# Patient Record
Sex: FEMALE | Race: WHITE | NOT HISPANIC OR LATINO | Employment: OTHER | URBAN - METROPOLITAN AREA
[De-identification: names, ages, dates, MRNs, and addresses within clinical notes are randomized per-mention and may not be internally consistent; named-entity substitution may affect disease eponyms.]

---

## 2017-12-05 ENCOUNTER — HISTORICAL (OUTPATIENT)
Dept: INTERNAL MEDICINE | Facility: CLINIC | Age: 62
End: 2017-12-05

## 2018-11-19 ENCOUNTER — HISTORICAL (OUTPATIENT)
Dept: ADMINISTRATIVE | Facility: HOSPITAL | Age: 63
End: 2018-11-19

## 2018-11-19 ENCOUNTER — HISTORICAL (OUTPATIENT)
Dept: INTERNAL MEDICINE | Facility: CLINIC | Age: 63
End: 2018-11-19

## 2018-11-19 LAB
ABS NEUT (OLG): 5.37 X10(3)/MCL (ref 2.1–9.2)
ALBUMIN SERPL-MCNC: 2.9 GM/DL (ref 3.4–5)
ALBUMIN/GLOB SERPL: 1 RATIO (ref 1–2)
ALP SERPL-CCNC: 103 UNIT/L (ref 45–117)
ALT SERPL-CCNC: 19 UNIT/L (ref 12–78)
APPEARANCE, UA: CLEAR
AST SERPL-CCNC: 13 UNIT/L (ref 15–37)
BACTERIA #/AREA URNS AUTO: ABNORMAL /[HPF]
BASOPHILS # BLD AUTO: 0.06 X10(3)/MCL
BASOPHILS NFR BLD AUTO: 1 %
BILIRUB SERPL-MCNC: 0.2 MG/DL (ref 0.2–1)
BILIRUB UR QL STRIP: NEGATIVE
BILIRUBIN DIRECT+TOT PNL SERPL-MCNC: <0.1 MG/DL
BILIRUBIN DIRECT+TOT PNL SERPL-MCNC: ABNORMAL MG/DL
BUN SERPL-MCNC: 6 MG/DL (ref 7–18)
CALCIUM SERPL-MCNC: 8.4 MG/DL (ref 8.5–10.1)
CHLORIDE SERPL-SCNC: 102 MMOL/L (ref 98–107)
CHOLEST SERPL-MCNC: 150 MG/DL
CHOLEST/HDLC SERPL: 2.5 {RATIO} (ref 0–4.4)
CO2 SERPL-SCNC: 31 MMOL/L (ref 21–32)
COLOR UR: COLORLESS
CREAT SERPL-MCNC: 0.7 MG/DL (ref 0.6–1.3)
CRP SERPL-MCNC: 5.9 MG/DL
EOSINOPHIL # BLD AUTO: 0.15 X10(3)/MCL
EOSINOPHIL NFR BLD AUTO: 2 %
ERYTHROCYTE [DISTWIDTH] IN BLOOD BY AUTOMATED COUNT: 13.6 % (ref 11.5–14.5)
ERYTHROCYTE [SEDIMENTATION RATE] IN BLOOD: 18 MM/HR (ref 0–20)
EST. AVERAGE GLUCOSE BLD GHB EST-MCNC: 117 MG/DL
GLOBULIN SER-MCNC: 4.1 GM/ML (ref 2.3–3.5)
GLUCOSE (UA): NORMAL
GLUCOSE SERPL-MCNC: 80 MG/DL (ref 74–106)
HAV IGM SERPL QL IA: NONREACTIVE
HBA1C MFR BLD: 5.7 % (ref 4.2–6.3)
HBV CORE IGM SERPL QL IA: NONREACTIVE
HBV SURFACE AG SERPL QL IA: NEGATIVE
HCT VFR BLD AUTO: 39.6 % (ref 35–46)
HCV AB SERPL QL IA: NONREACTIVE
HDLC SERPL-MCNC: 59 MG/DL
HGB BLD-MCNC: 12.6 GM/DL (ref 12–16)
HGB UR QL STRIP: NEGATIVE
HIV 1+2 AB+HIV1 P24 AG SERPL QL IA: NORMAL
HYALINE CASTS #/AREA URNS LPF: ABNORMAL /[LPF]
IMM GRANULOCYTES # BLD AUTO: 0.08 10*3/UL
IMM GRANULOCYTES NFR BLD AUTO: 1 %
KETONES UR QL STRIP: NEGATIVE
LDLC SERPL CALC-MCNC: 68 MG/DL (ref 0–130)
LEUKOCYTE ESTERASE UR QL STRIP: NEGATIVE
LYMPHOCYTES # BLD AUTO: 2.09 X10(3)/MCL
LYMPHOCYTES NFR BLD AUTO: 25 % (ref 13–40)
MCH RBC QN AUTO: 29.6 PG (ref 26–34)
MCHC RBC AUTO-ENTMCNC: 31.8 GM/DL (ref 31–37)
MCV RBC AUTO: 93 FL (ref 80–100)
MONOCYTES # BLD AUTO: 0.55 X10(3)/MCL
MONOCYTES NFR BLD AUTO: 7 % (ref 4–12)
NEUTROPHILS # BLD AUTO: 5.37 X10(3)/MCL
NEUTROPHILS NFR BLD AUTO: 65 X10(3)/MCL
NITRITE UR QL STRIP: NEGATIVE
PH UR STRIP: 5.5 [PH] (ref 4.5–8)
PLATELET # BLD AUTO: 328 X10(3)/MCL (ref 130–400)
PMV BLD AUTO: 9.7 FL (ref 7.4–10.4)
POTASSIUM SERPL-SCNC: 3.6 MMOL/L (ref 3.5–5.1)
PROT SERPL-MCNC: 7 GM/DL (ref 6.4–8.2)
PROT UR QL STRIP: NEGATIVE
RBC # BLD AUTO: 4.26 X10(6)/MCL (ref 4–5.2)
RBC #/AREA URNS AUTO: ABNORMAL /[HPF]
SODIUM SERPL-SCNC: 138 MMOL/L (ref 136–145)
SP GR UR STRIP: 1 (ref 1–1.03)
SQUAMOUS #/AREA URNS LPF: ABNORMAL /[LPF]
T PALLIDUM AB SER QL: NONREACTIVE
T4 FREE SERPL-MCNC: 0.64 NG/DL (ref 0.76–1.46)
TRIGL SERPL-MCNC: 114 MG/DL
TSH SERPL-ACNC: 12.7 MIU/L (ref 0.36–3.74)
UROBILINOGEN UR STRIP-ACNC: NORMAL
VLDLC SERPL CALC-MCNC: 23 MG/DL
WBC # SPEC AUTO: 8.3 X10(3)/MCL (ref 4.5–11)
WBC #/AREA URNS AUTO: ABNORMAL /HPF

## 2019-04-26 ENCOUNTER — HISTORICAL (OUTPATIENT)
Dept: RADIOLOGY | Facility: HOSPITAL | Age: 64
End: 2019-04-26

## 2019-05-29 ENCOUNTER — HISTORICAL (OUTPATIENT)
Dept: ADMINISTRATIVE | Facility: HOSPITAL | Age: 64
End: 2019-05-29

## 2019-05-29 LAB
ABS NEUT (OLG): 5.94 X10(3)/MCL (ref 2.1–9.2)
ALBUMIN SERPL-MCNC: 2.8 GM/DL (ref 3.4–5)
ALBUMIN/GLOB SERPL: 0.7 RATIO (ref 1.1–2)
ALP SERPL-CCNC: 81 UNIT/L (ref 45–117)
ALT SERPL-CCNC: 13 UNIT/L (ref 12–78)
APPEARANCE, UA: ABNORMAL
AST SERPL-CCNC: 14 UNIT/L (ref 15–37)
BACTERIA #/AREA URNS AUTO: ABNORMAL /[HPF]
BASOPHILS # BLD AUTO: 0.05 X10(3)/MCL
BASOPHILS NFR BLD AUTO: 1 %
BILIRUB SERPL-MCNC: 0.2 MG/DL (ref 0.2–1)
BILIRUB UR QL STRIP: NEGATIVE
BILIRUBIN DIRECT+TOT PNL SERPL-MCNC: <0.1 MG/DL
BILIRUBIN DIRECT+TOT PNL SERPL-MCNC: ABNORMAL MG/DL
BUN SERPL-MCNC: 9 MG/DL (ref 7–18)
CALCIUM SERPL-MCNC: 9 MG/DL (ref 8.5–10.1)
CHLORIDE SERPL-SCNC: 100 MMOL/L (ref 98–107)
CO2 SERPL-SCNC: 32 MMOL/L (ref 21–32)
COLOR UR: ABNORMAL
CREAT SERPL-MCNC: 1 MG/DL (ref 0.6–1.3)
CRP SERPL-MCNC: 4.8 MG/DL
EOSINOPHIL # BLD AUTO: 0.24 10*3/UL
EOSINOPHIL NFR BLD AUTO: 3 %
ERYTHROCYTE [DISTWIDTH] IN BLOOD BY AUTOMATED COUNT: 13.3 % (ref 11.5–14.5)
ERYTHROCYTE [SEDIMENTATION RATE] IN BLOOD: 48 MM/HR (ref 0–20)
GLOBULIN SER-MCNC: 4.3 GM/ML (ref 2.3–3.5)
GLUCOSE (UA): NORMAL
GLUCOSE SERPL-MCNC: 88 MG/DL (ref 74–106)
HBV CORE AB SERPL QL IA: NONREACTIVE
HCT VFR BLD AUTO: 34.1 % (ref 35–46)
HGB BLD-MCNC: 10.7 GM/DL (ref 12–16)
HGB UR QL STRIP: NEGATIVE
HYALINE CASTS #/AREA URNS LPF: ABNORMAL /[LPF]
IMM GRANULOCYTES # BLD AUTO: 0.04 10*3/UL
IMM GRANULOCYTES NFR BLD AUTO: 0 %
KETONES UR QL STRIP: NEGATIVE
LEUKOCYTE ESTERASE UR QL STRIP: 75 LEU/UL
LYMPHOCYTES # BLD AUTO: 2.13 X10(3)/MCL
LYMPHOCYTES NFR BLD AUTO: 24 % (ref 13–40)
MCH RBC QN AUTO: 29.3 PG (ref 26–34)
MCHC RBC AUTO-ENTMCNC: 31.4 GM/DL (ref 31–37)
MCV RBC AUTO: 93.4 FL (ref 80–100)
MONOCYTES # BLD AUTO: 0.63 X10(3)/MCL
MONOCYTES NFR BLD AUTO: 7 % (ref 4–12)
NEG CONT SPOT COUNT: NORMAL
NEUTROPHILS # BLD AUTO: 5.94 X10(3)/MCL
NEUTROPHILS NFR BLD AUTO: 66 X10(3)/MCL
NITRITE UR QL STRIP: NEGATIVE
PANEL A SPOT COUNT: 0
PANEL B SPOT COUNT: 0
PH UR STRIP: 6 [PH] (ref 4.5–8)
PLATELET # BLD AUTO: 336 X10(3)/MCL (ref 130–400)
PMV BLD AUTO: 9.4 FL (ref 7.4–10.4)
POS CONT SPOT COUNT: NORMAL
POTASSIUM SERPL-SCNC: 4.2 MMOL/L (ref 3.5–5.1)
PROT SERPL-MCNC: 7.1 GM/DL (ref 6.4–8.2)
PROT UR QL STRIP: NEGATIVE
RBC # BLD AUTO: 3.65 X10(6)/MCL (ref 4–5.2)
RBC #/AREA URNS AUTO: ABNORMAL /[HPF]
SODIUM SERPL-SCNC: 137 MMOL/L (ref 136–145)
SP GR UR STRIP: 1 (ref 1–1.03)
SQUAMOUS #/AREA URNS LPF: >100 /[LPF]
T-SPOT.TB: NORMAL
T3FREE SERPL-MCNC: 1.25 PG/ML (ref 2.18–3.98)
T4 FREE SERPL-MCNC: 0.62 NG/DL (ref 0.76–1.46)
TSH SERPL-ACNC: 17.9 MIU/L (ref 0.36–3.74)
UROBILINOGEN UR STRIP-ACNC: NORMAL
WBC # SPEC AUTO: 9 X10(3)/MCL (ref 4.5–11)
WBC #/AREA URNS AUTO: ABNORMAL /HPF

## 2019-05-31 LAB — FINAL CULTURE: NO GROWTH

## 2019-06-11 ENCOUNTER — HISTORICAL (OUTPATIENT)
Dept: RADIOLOGY | Facility: HOSPITAL | Age: 64
End: 2019-06-11

## 2019-06-21 ENCOUNTER — HISTORICAL (OUTPATIENT)
Dept: ADMINISTRATIVE | Facility: HOSPITAL | Age: 64
End: 2019-06-21

## 2019-08-22 ENCOUNTER — HISTORICAL (OUTPATIENT)
Dept: RADIOLOGY | Facility: HOSPITAL | Age: 64
End: 2019-08-22

## 2019-09-19 ENCOUNTER — HISTORICAL (OUTPATIENT)
Dept: RADIOLOGY | Facility: HOSPITAL | Age: 64
End: 2019-09-19

## 2020-01-15 ENCOUNTER — HISTORICAL (OUTPATIENT)
Dept: ADMINISTRATIVE | Facility: HOSPITAL | Age: 65
End: 2020-01-15

## 2020-01-15 LAB
ABS NEUT (OLG): 9.7 X10(3)/MCL (ref 2.1–9.2)
ALBUMIN SERPL-MCNC: 3.3 GM/DL (ref 3.4–5)
ALBUMIN/GLOB SERPL: 0.8 RATIO (ref 1.1–2)
ALP SERPL-CCNC: 74 UNIT/L (ref 45–117)
ALT SERPL-CCNC: 14 UNIT/L (ref 12–78)
AST SERPL-CCNC: 15 UNIT/L (ref 15–37)
BASOPHILS # BLD AUTO: 0 X10(3)/MCL (ref 0–0.2)
BASOPHILS NFR BLD AUTO: 0 %
BILIRUB SERPL-MCNC: 0.3 MG/DL (ref 0.2–1)
BILIRUBIN DIRECT+TOT PNL SERPL-MCNC: <0.1 MG/DL (ref 0–0.2)
BILIRUBIN DIRECT+TOT PNL SERPL-MCNC: ABNORMAL MG/DL
BUN SERPL-MCNC: 17 MG/DL (ref 7–18)
CALCIUM SERPL-MCNC: 9 MG/DL (ref 8.5–10.1)
CHLORIDE SERPL-SCNC: 100 MMOL/L (ref 98–107)
CHOLEST SERPL-MCNC: 200 MG/DL
CHOLEST/HDLC SERPL: 2.2 {RATIO} (ref 0–4.4)
CO2 SERPL-SCNC: 32 MMOL/L (ref 21–32)
CREAT SERPL-MCNC: 0.9 MG/DL (ref 0.6–1.3)
DEPRECATED CALCIDIOL+CALCIFEROL SERPL-MC: 10.64 NG/ML (ref 30–80)
EOSINOPHIL # BLD AUTO: 0 X10(3)/MCL (ref 0–0.9)
EOSINOPHIL NFR BLD AUTO: 0 %
ERYTHROCYTE [DISTWIDTH] IN BLOOD BY AUTOMATED COUNT: 13.5 % (ref 11.5–14.5)
ERYTHROCYTE [SEDIMENTATION RATE] IN BLOOD: 13 MM/HR (ref 0–20)
GLOBULIN SER-MCNC: 4.1 GM/ML (ref 2.3–3.5)
GLUCOSE SERPL-MCNC: 109 MG/DL (ref 74–106)
HAPTOGLOB SERPL-MCNC: 274 MG/DL (ref 7.8–200)
HCT VFR BLD AUTO: 38.2 % (ref 35–46)
HDLC SERPL-MCNC: 93 MG/DL (ref 40–59)
HGB BLD-MCNC: 12.3 GM/DL (ref 12–16)
IMM GRANULOCYTES # BLD AUTO: 0.04 10*3/UL
IMM GRANULOCYTES NFR BLD AUTO: 0 %
IRON SATN MFR SERPL: 17.7 % (ref 15–50)
IRON SERPL-MCNC: 45 MCG/DL (ref 50–170)
LDH SERPL-CCNC: 189 UNIT/L (ref 84–246)
LDLC SERPL CALC-MCNC: 91 MG/DL
LYMPHOCYTES # BLD AUTO: 1.8 X10(3)/MCL (ref 0.6–4.6)
LYMPHOCYTES NFR BLD AUTO: 15 %
MCH RBC QN AUTO: 30.9 PG (ref 26–34)
MCHC RBC AUTO-ENTMCNC: 32.2 GM/DL (ref 31–37)
MCV RBC AUTO: 96 FL (ref 80–100)
MONOCYTES # BLD AUTO: 0.4 X10(3)/MCL (ref 0.1–1.3)
MONOCYTES NFR BLD AUTO: 3 %
NEUTROPHILS # BLD AUTO: 9.7 X10(3)/MCL (ref 2.1–9.2)
NEUTROPHILS NFR BLD AUTO: 81 %
PLATELET # BLD AUTO: 328 X10(3)/MCL (ref 130–400)
PMV BLD AUTO: 8.9 FL (ref 7.4–10.4)
POTASSIUM SERPL-SCNC: 4.1 MMOL/L (ref 3.5–5.1)
PROT SERPL-MCNC: 7.4 GM/DL (ref 6.4–8.2)
RBC # BLD AUTO: 3.98 X10(6)/MCL (ref 4–5.2)
RET# (OHS): 0.05 X10(6)/MCL (ref 0.02–0.08)
RETICULOCYTE COUNT AUTOMATED (OLG): 1.3 % (ref 0.5–1.5)
SODIUM SERPL-SCNC: 136 MMOL/L (ref 136–145)
T4 FREE SERPL-MCNC: 0.65 NG/DL (ref 0.76–1.46)
TIBC SERPL-MCNC: 254 MCG/DL (ref 250–450)
TRANSFERRIN SERPL-MCNC: 217 MG/DL (ref 200–360)
TRIGL SERPL-MCNC: 81 MG/DL
TSH SERPL-ACNC: 12.2 MIU/L (ref 0.36–3.74)
VLDLC SERPL CALC-MCNC: 16 MG/DL
WBC # SPEC AUTO: 11.9 X10(3)/MCL (ref 4.5–11)

## 2020-03-06 ENCOUNTER — HISTORICAL (OUTPATIENT)
Dept: RESPIRATORY THERAPY | Facility: HOSPITAL | Age: 65
End: 2020-03-06

## 2020-03-12 ENCOUNTER — HISTORICAL (OUTPATIENT)
Dept: INTERNAL MEDICINE | Facility: CLINIC | Age: 65
End: 2020-03-12

## 2020-03-12 LAB
T4 FREE SERPL-MCNC: 0.84 NG/DL (ref 0.76–1.46)
TSH SERPL-ACNC: 14.58 MIU/L (ref 0.36–3.74)

## 2020-08-19 ENCOUNTER — HISTORICAL (OUTPATIENT)
Dept: INTERNAL MEDICINE | Facility: CLINIC | Age: 65
End: 2020-08-19

## 2020-08-19 LAB
ABS NEUT (OLG): 3.54 X10(3)/MCL (ref 2.1–9.2)
ALBUMIN SERPL-MCNC: 3.7 GM/DL (ref 3.4–5)
ALBUMIN/GLOB SERPL: 0.9 RATIO (ref 1.1–2)
ALP SERPL-CCNC: 106 UNIT/L (ref 45–117)
ALT SERPL-CCNC: 13 UNIT/L (ref 12–78)
AST SERPL-CCNC: 14 UNIT/L (ref 15–37)
BASOPHILS # BLD AUTO: 0 X10(3)/MCL (ref 0–0.2)
BASOPHILS NFR BLD AUTO: 0 %
BILIRUB SERPL-MCNC: 0.4 MG/DL (ref 0.2–1)
BILIRUBIN DIRECT+TOT PNL SERPL-MCNC: 0.1 MG/DL (ref 0–0.2)
BILIRUBIN DIRECT+TOT PNL SERPL-MCNC: 0.3 MG/DL
BUN SERPL-MCNC: 10 MG/DL (ref 7–18)
CALCIUM SERPL-MCNC: 9.4 MG/DL (ref 8.5–10.1)
CHLORIDE SERPL-SCNC: 102 MMOL/L (ref 98–107)
CO2 SERPL-SCNC: 31 MMOL/L (ref 21–32)
CREAT SERPL-MCNC: 1 MG/DL (ref 0.6–1.3)
CRP SERPL-MCNC: 1.7 MG/DL
EOSINOPHIL # BLD AUTO: 0.2 X10(3)/MCL (ref 0–0.9)
EOSINOPHIL NFR BLD AUTO: 2 %
ERYTHROCYTE [DISTWIDTH] IN BLOOD BY AUTOMATED COUNT: 12.5 % (ref 11.5–14.5)
ERYTHROCYTE [SEDIMENTATION RATE] IN BLOOD: 12 MM/HR (ref 0–20)
GLOBULIN SER-MCNC: 4.3 GM/ML (ref 2.3–3.5)
GLUCOSE SERPL-MCNC: 104 MG/DL (ref 74–106)
HCT VFR BLD AUTO: 44.5 % (ref 35–46)
HGB BLD-MCNC: 14.4 GM/DL (ref 12–16)
IMM GRANULOCYTES # BLD AUTO: 0.02 10*3/UL
IMM GRANULOCYTES NFR BLD AUTO: 0 %
LYMPHOCYTES # BLD AUTO: 4.2 X10(3)/MCL (ref 0.6–4.6)
LYMPHOCYTES NFR BLD AUTO: 49 %
MCH RBC QN AUTO: 31.4 PG (ref 26–34)
MCHC RBC AUTO-ENTMCNC: 32.4 GM/DL (ref 31–37)
MCV RBC AUTO: 96.9 FL (ref 80–100)
MONOCYTES # BLD AUTO: 0.6 X10(3)/MCL (ref 0.1–1.3)
MONOCYTES NFR BLD AUTO: 7 %
NEUTROPHILS # BLD AUTO: 3.54 X10(3)/MCL (ref 2.1–9.2)
NEUTROPHILS NFR BLD AUTO: 41 %
PLATELET # BLD AUTO: 277 X10(3)/MCL (ref 130–400)
PMV BLD AUTO: 9.3 FL (ref 7.4–10.4)
POTASSIUM SERPL-SCNC: 4.4 MMOL/L (ref 3.5–5.1)
PROT SERPL-MCNC: 8 GM/DL (ref 6.4–8.2)
RBC # BLD AUTO: 4.59 X10(6)/MCL (ref 4–5.2)
SODIUM SERPL-SCNC: 138 MMOL/L (ref 136–145)
WBC # SPEC AUTO: 8.6 X10(3)/MCL (ref 4.5–11)

## 2020-12-08 ENCOUNTER — HISTORICAL (OUTPATIENT)
Dept: LAB | Facility: HOSPITAL | Age: 65
End: 2020-12-08

## 2020-12-08 LAB
ABS NEUT (OLG): 3.42 X10(3)/MCL (ref 2.1–9.2)
ALBUMIN SERPL-MCNC: 4 GM/DL (ref 3.4–4.8)
ALBUMIN/GLOB SERPL: 1.2 RATIO (ref 1.1–2)
ALP SERPL-CCNC: 97 UNIT/L (ref 40–150)
ALT SERPL-CCNC: 7 UNIT/L (ref 0–55)
AST SERPL-CCNC: 14 UNIT/L (ref 5–34)
BASOPHILS # BLD AUTO: 0.1 X10(3)/MCL (ref 0–0.2)
BASOPHILS NFR BLD AUTO: 1 %
BILIRUB SERPL-MCNC: 0.5 MG/DL
BILIRUBIN DIRECT+TOT PNL SERPL-MCNC: 0.2 MG/DL (ref 0–0.5)
BILIRUBIN DIRECT+TOT PNL SERPL-MCNC: 0.3 MG/DL (ref 0–0.8)
BUN SERPL-MCNC: 8 MG/DL (ref 9.8–20.1)
CALCIUM SERPL-MCNC: 9.4 MG/DL (ref 8.4–10.2)
CHLORIDE SERPL-SCNC: 100 MMOL/L (ref 98–107)
CO2 SERPL-SCNC: 32 MMOL/L (ref 23–31)
CREAT SERPL-MCNC: 0.84 MG/DL (ref 0.55–1.02)
CRP SERPL-MCNC: 0.6 MG/DL
DEPRECATED CALCIDIOL+CALCIFEROL SERPL-MC: 26.8 NG/ML (ref 30–80)
EOSINOPHIL # BLD AUTO: 0.2 X10(3)/MCL (ref 0–0.9)
EOSINOPHIL NFR BLD AUTO: 3 %
ERYTHROCYTE [DISTWIDTH] IN BLOOD BY AUTOMATED COUNT: 12.5 % (ref 11.5–14.5)
ERYTHROCYTE [SEDIMENTATION RATE] IN BLOOD: 7 MM/HR (ref 0–20)
GLOBULIN SER-MCNC: 3.4 GM/DL (ref 2.4–3.5)
GLUCOSE SERPL-MCNC: 98 MG/DL (ref 82–115)
HBV CORE AB SERPL QL IA: NONREACTIVE
HBV CORE IGM SERPL QL IA: NONREACTIVE
HBV SURFACE AG SERPL QL IA: NONREACTIVE
HCT VFR BLD AUTO: 46.5 % (ref 35–46)
HCV AB SERPL QL IA: NONREACTIVE
HGB BLD-MCNC: 14.6 GM/DL (ref 12–16)
IMM GRANULOCYTES # BLD AUTO: 0.01 10*3/UL
IMM GRANULOCYTES NFR BLD AUTO: 0 %
LYMPHOCYTES # BLD AUTO: 4 X10(3)/MCL (ref 0.6–4.6)
LYMPHOCYTES NFR BLD AUTO: 48 %
MCH RBC QN AUTO: 31.1 PG (ref 26–34)
MCHC RBC AUTO-ENTMCNC: 31.4 GM/DL (ref 31–37)
MCV RBC AUTO: 98.9 FL (ref 80–100)
MONOCYTES # BLD AUTO: 0.5 X10(3)/MCL (ref 0.1–1.3)
MONOCYTES NFR BLD AUTO: 6 %
NEG CONT SPOT COUNT: NORMAL
NEUTROPHILS # BLD AUTO: 3.42 X10(3)/MCL (ref 2.1–9.2)
NEUTROPHILS NFR BLD AUTO: 42 %
PANEL A SPOT COUNT: 2
PANEL B SPOT COUNT: 4
PLATELET # BLD AUTO: 254 X10(3)/MCL (ref 130–400)
PMV BLD AUTO: 9.8 FL (ref 7.4–10.4)
POS CONT SPOT COUNT: NORMAL
POTASSIUM SERPL-SCNC: 3.9 MMOL/L (ref 3.5–5.1)
PROT SERPL-MCNC: 7.4 GM/DL (ref 5.8–7.6)
RBC # BLD AUTO: 4.7 X10(6)/MCL (ref 4–5.2)
SODIUM SERPL-SCNC: 138 MMOL/L (ref 136–145)
T-SPOT.TB: NORMAL
T4 FREE SERPL-MCNC: 1.12 NG/DL (ref 0.7–1.48)
TSH SERPL-ACNC: 3.1 UIU/ML (ref 0.35–4.94)
WBC # SPEC AUTO: 8.3 X10(3)/MCL (ref 4.5–11)

## 2020-12-17 ENCOUNTER — HISTORICAL (OUTPATIENT)
Dept: RADIOLOGY | Facility: HOSPITAL | Age: 65
End: 2020-12-17

## 2021-03-16 ENCOUNTER — HISTORICAL (OUTPATIENT)
Dept: RADIOLOGY | Facility: HOSPITAL | Age: 66
End: 2021-03-16

## 2021-06-30 ENCOUNTER — HISTORICAL (OUTPATIENT)
Dept: ADMINISTRATIVE | Facility: HOSPITAL | Age: 66
End: 2021-06-30

## 2021-06-30 LAB
ABS NEUT (OLG): 5.52 X10(3)/MCL (ref 2.1–9.2)
ALBUMIN SERPL-MCNC: 3.6 GM/DL (ref 3.4–4.8)
ALBUMIN/GLOB SERPL: 1 RATIO (ref 1.1–2)
ALP SERPL-CCNC: 83 UNIT/L (ref 40–150)
ALT SERPL-CCNC: 7 UNIT/L (ref 0–55)
APPEARANCE, UA: CLEAR
AST SERPL-CCNC: 16 UNIT/L (ref 5–34)
BACTERIA #/AREA URNS AUTO: ABNORMAL /HPF
BASOPHILS # BLD AUTO: 0 X10(3)/MCL (ref 0–0.2)
BASOPHILS NFR BLD AUTO: 0 %
BILIRUB SERPL-MCNC: 0.3 MG/DL
BILIRUB UR QL STRIP: NEGATIVE
BILIRUBIN DIRECT+TOT PNL SERPL-MCNC: 0.1 MG/DL (ref 0–0.5)
BILIRUBIN DIRECT+TOT PNL SERPL-MCNC: 0.2 MG/DL (ref 0–0.8)
BUN SERPL-MCNC: 11.3 MG/DL (ref 9.8–20.1)
CALCIUM SERPL-MCNC: 9.5 MG/DL (ref 8.4–10.2)
CHLORIDE SERPL-SCNC: 101 MMOL/L (ref 98–107)
CHOLEST SERPL-MCNC: 189 MG/DL
CHOLEST/HDLC SERPL: 3 {RATIO} (ref 0–5)
CO2 SERPL-SCNC: 30 MMOL/L (ref 23–31)
COLOR UR: COLORLESS
CREAT SERPL-MCNC: 0.92 MG/DL (ref 0.55–1.02)
CREAT UR-MCNC: 24.3 MG/DL (ref 45–106)
DEPRECATED CALCIDIOL+CALCIFEROL SERPL-MC: 22.6 NG/ML (ref 30–80)
EOSINOPHIL # BLD AUTO: 0.2 X10(3)/MCL (ref 0–0.9)
EOSINOPHIL NFR BLD AUTO: 2 %
ERYTHROCYTE [DISTWIDTH] IN BLOOD BY AUTOMATED COUNT: 12 % (ref 11.5–14.5)
EST CREAT CLEARANCE SER (OHS): 45.79 ML/MIN
EST. AVERAGE GLUCOSE BLD GHB EST-MCNC: 105.4 MG/DL
GLOBULIN SER-MCNC: 3.7 GM/DL (ref 2.4–3.5)
GLUCOSE (UA): NEGATIVE
GLUCOSE SERPL-MCNC: 75 MG/DL (ref 82–115)
HBA1C MFR BLD: 5.3 %
HCT VFR BLD AUTO: 43.2 % (ref 35–46)
HDLC SERPL-MCNC: 58 MG/DL (ref 35–60)
HGB BLD-MCNC: 13.9 GM/DL (ref 12–16)
HGB UR QL STRIP: NEGATIVE
HYALINE CASTS #/AREA URNS LPF: ABNORMAL /LPF
IMM GRANULOCYTES # BLD AUTO: 0.04 10*3/UL
IMM GRANULOCYTES NFR BLD AUTO: 0 %
KETONES UR QL STRIP: NEGATIVE
LDLC SERPL CALC-MCNC: 102 MG/DL (ref 50–140)
LEUKOCYTE ESTERASE UR QL STRIP: NEGATIVE
LYMPHOCYTES # BLD AUTO: 3.3 X10(3)/MCL (ref 0.6–4.6)
LYMPHOCYTES NFR BLD AUTO: 34 %
MCH RBC QN AUTO: 31.2 PG (ref 26–34)
MCHC RBC AUTO-ENTMCNC: 32.2 GM/DL (ref 31–37)
MCV RBC AUTO: 96.9 FL (ref 80–100)
MONOCYTES # BLD AUTO: 0.6 X10(3)/MCL (ref 0.1–1.3)
MONOCYTES NFR BLD AUTO: 6 %
NEUTROPHILS # BLD AUTO: 5.52 X10(3)/MCL (ref 2.1–9.2)
NEUTROPHILS NFR BLD AUTO: 57 %
NITRITE UR QL STRIP: NEGATIVE
NRBC BLD AUTO-RTO: 0 % (ref 0–0.2)
PH UR STRIP: 6 [PH] (ref 4.5–8)
PLATELET # BLD AUTO: 261 X10(3)/MCL (ref 130–400)
PMV BLD AUTO: 10.1 FL (ref 7.4–10.4)
POTASSIUM SERPL-SCNC: 4.1 MMOL/L (ref 3.5–5.1)
PROT SERPL-MCNC: 7.3 GM/DL (ref 5.8–7.6)
PROT UR QL STRIP: NEGATIVE
PROT UR STRIP-MCNC: <6.8 MG/DL
PROT/CREAT UR-RTO: <279.8 MG/GM CR
RBC # BLD AUTO: 4.46 X10(6)/MCL (ref 4–5.2)
RBC #/AREA URNS AUTO: ABNORMAL /HPF
SODIUM SERPL-SCNC: 140 MMOL/L (ref 136–145)
SP GR UR STRIP: 1 (ref 1–1.03)
SQUAMOUS #/AREA URNS LPF: ABNORMAL /LPF
T4 FREE SERPL-MCNC: 0.82 NG/DL (ref 0.7–1.48)
TRIGL SERPL-MCNC: 144 MG/DL (ref 37–140)
TSH SERPL-ACNC: 10.98 UIU/ML (ref 0.35–4.94)
URATE SERPL-MCNC: 5.4 MG/DL (ref 2.6–6)
UROBILINOGEN UR STRIP-ACNC: NORMAL
VLDLC SERPL CALC-MCNC: 29 MG/DL
WBC # SPEC AUTO: 9.7 X10(3)/MCL (ref 4.5–11)
WBC #/AREA URNS AUTO: ABNORMAL /HPF

## 2021-08-18 ENCOUNTER — HISTORICAL (OUTPATIENT)
Dept: RADIOLOGY | Facility: HOSPITAL | Age: 66
End: 2021-08-18

## 2021-12-08 ENCOUNTER — HISTORICAL (OUTPATIENT)
Dept: ADMINISTRATIVE | Facility: HOSPITAL | Age: 66
End: 2021-12-08

## 2021-12-08 LAB — BUN SERPL-MCNC: 13.1 MG/DL (ref 9.8–20.1)

## 2022-01-05 ENCOUNTER — HISTORICAL (OUTPATIENT)
Dept: RADIOLOGY | Facility: HOSPITAL | Age: 67
End: 2022-01-05

## 2022-01-05 LAB — CREAT SERPL-MCNC: 0.82 MG/DL (ref 0.55–1.02)

## 2022-01-26 ENCOUNTER — HISTORICAL (OUTPATIENT)
Dept: RADIOLOGY | Facility: HOSPITAL | Age: 67
End: 2022-01-26

## 2022-04-10 ENCOUNTER — HISTORICAL (OUTPATIENT)
Dept: ADMINISTRATIVE | Facility: HOSPITAL | Age: 67
End: 2022-04-10

## 2022-04-28 ENCOUNTER — HISTORICAL (OUTPATIENT)
Dept: RADIOLOGY | Facility: HOSPITAL | Age: 67
End: 2022-04-28
Payer: MEDICAID

## 2022-04-29 VITALS
OXYGEN SATURATION: 92 % | SYSTOLIC BLOOD PRESSURE: 154 MMHG | WEIGHT: 103.38 LBS | WEIGHT: 108.25 LBS | DIASTOLIC BLOOD PRESSURE: 80 MMHG | BODY MASS INDEX: 21.25 KG/M2 | BODY MASS INDEX: 19.52 KG/M2 | HEIGHT: 60 IN | SYSTOLIC BLOOD PRESSURE: 147 MMHG | HEIGHT: 61 IN | DIASTOLIC BLOOD PRESSURE: 99 MMHG

## 2022-05-02 NOTE — HISTORICAL [facility] CERNER
This is a historical note converted from Cerdaniella. Formatting and pictures may have been removed.  Please reference Cerdaniella for original formatting and attached multimedia. Chief Complaint  annual  History of Present Illness  Pt is  (infant demise a few hrs after delivery) here for annual gyn exam. Denies hx of abnormal pap. Pt is postmenopausal since . Denies vaginal bleeding/discharge. Denies abd/pelvic pain. Pt is . Denies dyspareunia. Denies urinary or breast complaints. Followed in med clinic for primary care. Denies fly hx of breast, ovarian, uterine, or colon cancer. Pt is 10cig/day smoker and states has recently attended cessation classes.  Review of Systems  Negative except HPI  Physical Exam  Vitals & Measurements  T:?97.0? ?F (Oral)? HR:?70(Peripheral)? RR:?20? BP:?116/68?  HT:?158?cm? HT:?158?cm? WT:?49.6?kg? WT:?49.6?kg? BMI:?19.87?  General: Alert and oriented, No acute distress.  Breast: lt-No mass, No tenderness, No discharge. rt breast-dense consistency 11oclock; freely mobile  Gastrointestinal: Soft, Non-tender.  Gynecology:  Labia: Bilateral, Majora, Minora, Within normal limits.  Vagina: pale mucosa  Cervix: No cervical motion tenderness, No lesions.  Uterus: Mobile, Not tender.  Ovaries: Not tender, No mass.  Integumentary: Warm, Dry.  Neurologic: Alert, Oriented.  Psychiatric: Cooperative, Appropriate mood & affect.  Assessment/Plan  1.?Pap smear for cervical cancer screening?Z12.4  ? pap,hpv  ? contact clinic with any vaginal bleeding as this would be abnormal in postmenopausal pt; encouraged?dietary or supplemental intake of ?calcium 1200mg +Vitamin D 800IU?daily for bone health  Ordered:  Clinic Follow up, *Est. 19 3:00:00 CST, 1 Year, Order for future visit, Pap smear for cervical cancer screening, [facility]  Pathology Gyn Request, 18 14:18:00 CST, AP Specimen, Thin Prep Pap Cervical-Auto/man screen, Routine GYN/Pap, Cervical, Thin Prep with HPV Probe,  Post-Menopausal, 01/01/00, Previous Pap, over 5 yrs, Tubal Ligation, Previous Pregnancy, Cervix Present, Routine, RT - Rou...  Preventative Health Care Est 40-64 years [id number] PC, Pap smear for cervical cancer screening  Lump, breast  Tobacco user, [facility], 11/19/18 14:18:00 CST  ?  2.?Lump, breast?N63.0  ? -dense tissue palpated to rt breast; will change screening mammo to diagnostic mammo with US  Ordered:  MG Diagnostic Bilateral, Routine, *Est. 12/03/18 3:00:00 CST, Breast Lump/Thick, None, Ambulatory, Rad Type, Order for future visit, Lump, breast, Schedule this test, [facility], *Est. 12/03/18 3:00:00 CST  Preventative Health Care Est 40-64 years [id number] PC, Pap smear for cervical cancer screening  Lump, breast  Tobacco user, [facility], 11/19/18 14:18:00 CST  US Breast Right Complete, Routine, *Est. 12/03/18 3:00:00 CST, Palpable Lumps, None, Ambulatory, Rad Type, Order for future visit, Lump, breast, Schedule this test, [facility], *Est. 12/03/18 3:00:00 CST  ?  3.?Tobacco user?Z72.0  ? encouraged cessation-pt?already in program  Ordered:  Preventative Health Care Est 40-64 years [id number] PC, Pap smear for cervical cancer screening  Lump, breast  Tobacco user, [facility], 11/19/18 14:18:00 CST  ?   Problem List/Past Medical History  Ongoing  Arthritis  COPD - Chronic obstructive pulmonary disease  Graves disease  Tobacco user  Procedure/Surgical History  Bilateral tubal ligation  Cholecystectomy;   Medications  albuterol 2.5 mg/3 mL (0.083%) inhalation solution, 2.5 mg= 3 mL, INH, q6hr, PRN  clonazePAM 1 mg oral tablet, 1 mg= 1 tab(s), Oral, BID  levothyroxine 75 mcg (0.075 mg) oral tablet, 75 mcg= 1 tab(s), Oral, Daily  meloxicam 15 mg oral tablet, 15 mg= 1 tab(s), Oral, Daily, 3 refills  QUEtiapine 200 mg oral tablet, extended release, 200 mg= 1 tab(s), Oral, Daily  Symbicort 80 mcg-4.5 mcg/inh inhalation aerosol, 2 puff(s), INH, Daily, 3  refills  tiZANidine 4 mg oral tablet, 4 mg= 1 tab(s), Oral, TID  VENTOLIN HFA AER  Allergies  Bactrim?(vomiting)  Social History  Alcohol  Past, 05/14/2018  Employment/School  Unemployed, Highest education level: High school., 10/23/2018  Home/Environment  Lives with Spouse. Living situation: Home/Independent. Alcohol abuse in household: No. Substance abuse in household: No. Smoker in household: Yes. Feels unsafe at home: No. Safe place to go: Yes. Family/Friends available for support: Yes., 10/23/2018  Nutrition/Health  Regular, Caffeine intake amount: soda daily 12 oz Dr Pepper(6-7)., 10/23/2018  Substance Abuse  Past, 12/05/2017  Tobacco  Current every day smoker, Yes, 11/19/2018  Current every day smoker, No, 10/28/2018  Current every day smoker, Cigarettes, 10 per day. 43 year(s). Started age 18.0 Years. Previous treatment: Medications. Ready to change: Yes. Household tobacco concerns: Yes., 12/05/2017  Family History  Diabetes mellitus type 2: Father.  Immunizations  Vaccine Date Status   tetanus/diphtheria/pertussis, acel(Tdap) 10/23/2018 Given   influenza virus vaccine, inactivated 10/23/2018 Given

## 2022-05-02 NOTE — HISTORICAL [facility] CERNER
This is a historical note converted from Brinda. Formatting and pictures may have been removed.  Please reference Brinda for original formatting and attached multimedia. Chief Complaint  6 week followup right wrist mass  History of Present Illness  Patient is a 66-year-old female history of anxiety, rheumatoid arthritis, COPD who presents to clinic for evaluation for?right?wrist pain.? Patient has a?palpable volar ulnar wrist mass that been present there for about a year now. ?She denies any inciting event. ?Her main concern is?numbness and tingling?in the median nerve distribution.? She states that the pain wakes her from sleep. ?She obtained an EMG?that showed?severe carpal tunnel syndrome. ?Denies any other pain?or discomfort.  Review of Systems  Constitutional:?no fever, fatigue, weakness  Eye:?no vision loss, eye redness, drainage, or pain  ENMT:?no sore throat, ear pain, sinus pain/congestion, nasal congestion/drainage  Respiratory:?no cough, no wheezing, no shortness of breath  Cardiovascular:?no chest pain, no palpitations, no edema  Gastrointestinal:?no nausea, vomiting, or diarrhea. No abdominal pain  Physical Exam  Vitals & Measurements  HT:?152.00?cm? WT:?49.100?kg? BMI:?21.25?  Right upper extremity:  3 cm?poorly circumscribed?deep mass on the ulnar aspect of the wrist  Tender to palpation  Positive Tinels at the wrist  Positive Phalens and Durkans test  No thenar atrophy  Numbness in the median nerve distribution  Negative Tinels at the elbow  Pulses 2+  Assessment/Plan  Mass of wrist?R22.30  ?Patient is a 66-year-old female history of RA, COPD, Graves disease who has a right?volar wrist mass?that?is resulting in right carpal tunnel syndrome. ?Would like to obtain MRI of the right wrist?to further evaluate mass?as it is not in a typical?location of a ganglion cyst. ?We will?prescribe patient?single time dose of Ativan to help with?MRI anxiety. ?Follow-up in clinic after MRI  ?  ATTENDING  ADDENDUM  ?  Evert Goodman?was evaluated at the time of the encounter with?Dr Benjamin.? HPI, PE and treatment plan was reviewed.? Treatment plan was reasonable and necessary.??Imaging was reviewed at the time of visit.??  ?  ?  Ordered:  LORAzepam, 1 mg, form: Injection, IV Push, Once PRN for anxiety, first dose 12/08/21 12:16:00 CST, Rate of injection should not exceed 2 mg/minute, Future Order  BUN, Routine collect, 12/09/21 5:00:00 CST, Blood, Order for future visit, Stop date 12/09/21 5:00:00 CST, Lab Collect, Mass of wrist, 12/09/21 5:00:00 CST  MRI Ext Upper Joint Right W W/O Cont, Routine, 12/08/21 12:14:00 CST, Other (please specify), Wrist pain, palpable mass, neg xray, None, Stretcher, Patient Has IV?, Creatinine if needed per protocol, Rad Type, Order for future visit, Mass of wrist, Schedule this test, [facility]...  ?   Medications  Albuterol (Eqv-ProAir HFA) 90 mcg/inh inhalation aerosol, See Instructions  alPRAzolam 0.25 mg oral tab, 0.25 mg= 1 tab(s), Oral, Once,? ?Not taking  alPRAzolam 0.5 mg oral tablet, 0.5 mg= 1 tab(s), Oral, TID,? ?Not taking  azithromycin 250 mg oral tablet, Oral,? ?Not taking  cefdinir 300 mg oral capsule, 300 mg= 1 cap(s), Oral, BID,? ?Not taking  clonazePAM 1 mg oral tablet, 1 mg= 1 tab(s), Oral, BID  dexamethasone 6 mg oral tablet, 6 mg= 1 tab(s), Oral, qAM,? ?Not taking  doxycycline hyclate 100 mg oral capsule, 100 mg= 1 cap(s), Oral, BID,? ?Not taking  DuoNeb 0.5 mg-2.5 mg/3 mL inhalation solution, 3 mL, NEB, QID, PRN, 3 refills  Effexor XR 37.5 mg oral capsule, extended release, 37.5 mg= 1 cap(s), Oral, Daily, 1 refills,? ?Not taking  Enbrel SureClick 50 mg/mL subcutaneous solution, See Instructions  Humira Pen Psoriasis/Uveitis/Adol HidraSuppur Starter Pack 40 mg/0.8 mL subcutaneous kit, 40 mg, Subcutaneous,? ?Not taking: Last Dose Date/Time unknown  indomethacin 25 mg oral capsule, 25 mg= 1 cap(s), Oral, TID, PRN,? ?Not taking: Last Dose Date/Time  Unknown  levothyroxine 125 mcg (0.125 mg) oral tablet, 125 mcg= 1 tab(s), Oral, Daily  methylPREDNISolone 4 mg oral tab, Oral, As Directed,? ?Not taking  prednisONE 50 mg oral tablet, 50 mg= 1 tab(s), Oral, Daily,? ?Not Taking, Completed Rx  promethazine 25 mg oral tablet, 25 mg= 1 tab(s), Oral, q8hr,? ?Not taking  QUEtiapine 200 mg oral tablet, See Instructions  Symbicort 80 mcg-4.5 mcg/inh inhalation aerosol, 2 puff(s), INH, Daily, 3 refills  VITAMIN D 2,000 50 MCG Capsule, See Instructions,? ?Not taking: Last Dose Date/Time Unknown  Diagnostic Results  Independent review of right wrist radiographs patient has?significant degenerative changes throughout the carpus.

## 2022-05-02 NOTE — HISTORICAL [facility] CERNER
This is a historical note converted from Cerdaniella. Formatting and pictures may have been removed.  Please reference Brinda for original formatting and attached multimedia. Chief Complaint  follow-up / meds review  History of Present Illness  Ms. Goodman?is a 64 year smoker with past medical history of rheumatoid arthritis, Graves disease S/P STEINER and thyroidectomy, COPD stage III (diagnosed September 2017), with a lung nodule, last seen by rheumatology clinic 8/1/19?and?prescribed?folic acid and methotrexate for?high RA activity. Reason for not starting biologic was due to concern for malignancy/Pulmonary nodule seen on CT in april 2019.?PET performed 9/2019 showed 13 mm right lower lobe nodule stable in size from April. Patient initially?presented 9/29/19 c/o throat pain and was prescribed Nystatin swish and swallow. She?then presented?presented again 10/2/19 and was admitted?2/2 stomatitis, severe pancytopenia?and significant neutropenia thought to be induced by accidental MTX overdose. She was given leucovorin while inpatient prior to transfer. Due to severe condition and concern for unmasked malignancy, patient was emergently transferred to [facility] 10/5/19.?  ?   Patient returns to clinic today for regular f/u today after discharge from [facility]. Patient states BM biopsy performed was negative for any malignancy and her condition was solely a result of MTX overdose. Patient did not bring any documentation with her from [facility]. ?Today patient is complaining of severe joint pain and wrists and hands. ?She describes it as ?a burning and numbness, like being in extreme cold or frostbite. ?Patient states she has been off al rheumatologic medications since discharge from [facility] and does not have an appointment with Rheum clinic until 2/20/20.?  ?  Review of Systems  Constitutional:?No fever or chills, fatigue, unintentional weight loss  Eyes: No changes in vision  Ears:?No tinnitus, no hearing loss  Nose:?+ rhinitis,?no postnasal  drip  Lungs: No shortness of breath, no dyspnea on exertion, no?wheezing, no stridor  Heart:?No chest pain, no palpitations,?no?syncopal episodes  Abdomen:?No abdominal pain,?no?nausea, no vomiting,?no diarrhea,?no melena, hematochezia  : No frequency no dysuria  MSK:?+wrist pain, hand pain and erythema. +left shoulder pain  Extremities:?Normal strength,?normal ROM,?no swelling  Integumentary:?No rash, no excoriation,?no cyanosis,?no erythema,?no jaundice  Neuro:?No headache, no dizziness, no lightheadedness, no?gait disturbance,?no?balance disturbances  Psych:?No depression, no auditory hallucination, no?visual hallucination, no?SI/HI  Physical Exam  Vitals & Measurements  T:?36.7? ?C (Oral)? HR:?78(Peripheral)? RR:?22? BP:?130/69?  HT:?154.94?cm? WT:?43.5?kg? BMI:?18.12?  General: ?Alert and oriented, No acute distress. ?  Eye:??PEARRL, EOMI, Pale conjunctiva.?+EXOPTHALMOS  HEENT:? Supple, Non-tender, No carotid bruit, dry eyes and?oral mucosa?on observation, no pharyngeal exudates  Respiratory:??CTABL, Respirations are non-labored, Breath sounds are equal??  Cardiovascular: ?Normal rate, Regular rhythm, No murmur, No gallop, Good pulses equal in all extremities, Normal peripheral perfusion, No edema. ?  Gastrointestinal: ?Soft, Non-tender, Non-distended, Normal bowel sounds, No organomegaly.???  Musculoskeletal:??synovitis is present between digits of the bilateral hands as well as the left foot; extremities warm; she has ulnar deviation of the wrists b/l, as well as [place] nodes. bilateral UE +digital clubbing  Integumentary:??nodules developing?on b/l elbow joints??  Neurologic: ?Alert, Oriented, Normal sensory, Normal motor function, No focal deficits, Cranial Nerves II-XII are grossly intact.  Cognition and Speech: ?Oriented, Speech clear and coherent. ?  Psychiatric: ?Cooperative, Appropriate mood & affect. ?  ?  Assessment/Plan  S/P MTX overdose  -repeat CBC, retic#, Haptoglobin, LDH  -CMP  -Iron  profile  ?  RA  s/p MTX overdose  patient currently off all RA medications and experiencing significant pain  -Contacted Rheum today to attempt to schedule Rheumatology appointment earlier than 2/20/20  -ESR  ?  Graves  -TSH, Free T4  ?  Pulmonary nodule  Tobacco Use  COPD stage III  Patient smokes 1/2ppd >40y  -Continue albuterol rescue inhaler  -Continue Budesonide-formoterol  -albuterol neb  -repeat PFT  -ordered smoking cessation instruction today  ?  Anxiety  -Continue clonazepam 1mg HS  -Seroquel 200  ?  Wellness  -Flu vaccine today  -FIT test ordered  -CAGE 0/4  -Smoking cessation course ordered at this visit.  ?  Return to clinic in 1 month for close f/u  ?  Referrals  Clinic Follow up, *Est. 02/15/20 3:00:00 CST, Order for future visit, Accidental methotrexate overdose  Anxiety  Fibromyalgia  Flu vaccine need  Graves disease  Rheumatoid arthritis  Stage 3 severe COPD by GOLD classification  Wellness examination, [facility]  Clinic Follow up, *Est. 03/15/20 3:00:00 CDT, Order for future visit, Accidental methotrexate overdose  Anxiety  Fibromyalgia  Flu vaccine need  Graves disease  Rheumatoid arthritis  Stage 3 severe COPD by GOLD classification  Wellness examination, [facility]  Clinic Follow up, *Est. 05/15/20 3:00:00 CDT, Order for future visit, Accidental methotrexate overdose  Anxiety  Fibromyalgia  Flu vaccine need  Graves disease  Stage 3 severe COPD by GOLD classification  Wellness examination  Rheumatoid arthritis, [facility]   Problem List/Past Medical History  Ongoing  Arthritis  COPD - Chronic obstructive pulmonary disease  Graves disease  Knowledge deficit  Tobacco user  Historical  No qualifying data  Procedure/Surgical History  Insertion of Infusion Device into Superior Vena Cava, Percutaneous Approach (10/05/2019)  Transfusion of Nonautologous Red Blood Cells into Peripheral Vein, Percutaneous Approach (10/01/2019)  Bilateral tubal  ligation  Cholecystectomy;  Thyroidectomy   Medications  albuterol 2.5 mg/3 mL (0.083%) inhalation solution, 2.5 mg= 3 mL, INH, q6hr, PRN  albuterol CFC free 90 mcg/inh inhalation aerosol with adapter, 2 puff(s), INH, QID  benzonatate 100 mg oral capsule, 100 mg= 1 cap(s), Oral, TID,? ?Not Taking, Completed Rx: pt states  clonazePAM 1 mg oral tablet  diclofenac sodium 75 mg oral delayed release tablet, 75 mg= 1 tab(s), Oral, BID, 1 refills,? ?Not Taking, Completed Rx: pt states  DuoNeb 0.5 mg-2.5 mg/3 mL inhalation solution, 3 mL, NEB, QID, PRN  folic acid 1 mg oral tablet, 1 mg= 1 tab(s), Oral, Daily, 11 refills  folic acid 1 mg oral tablet, 1 mg= 1 tab(s), Oral, Daily,? ?Not taking: duplicate  hydrocodone-ibuprofen 7.5 mg-200 mg oral tablet, 1 tab(s), Oral, q4hr,? ?Not Taking, Completed Rx: pt states  influenza virus vaccine, inactivated quadrivalent intramuscular suspension, 0.5 mL, IM, Once-Unscheduled  levothyroxine 75 mcg (0.075 mg) oral tablet, 75 mcg= 1 tab(s), Oral, Daily, 2 refills  Lidocaine Viscous 2% mucous membrane solution, 0.2 gm= 10 mL, TOP, QIDACHS, PRN,? ?Not Taking, Completed Rx: pt states  meloxicam 15 mg oral tablet, 15 mg= 1 tab(s), Oral, Daily  methotrexate 2.5 mg oral tablet, See Instructions, 3 refills,? ?Not Taking per Prescriber: pt states  Nystatin 100,000 units/mL oral susp. (2oz Bottle), [id number] units= 5 mL, Oral, QID,? ?Not Taking, Completed Rx: pt states  Phenergan 25 mg Tab, 25 mg= 1 tab(s), Oral, q6hr  prednisONE 20 mg oral tablet, 20 mg= 1 tab(s), Oral, Daily,? ?Not Taking, Completed Rx: pt states  prednisONE 5 mg oral tablet, See Instructions,? ?Not Taking, Completed Rx: pt states  QUEtiapine 200 mg oral tablet, See Instructions, 1 refills  Shingrix intramuscular injection, 0.5 mL, IM, Once  sucralfate 1 g oral tablet, 1 gm= 1 tab(s), Oral, QID,? ?Not taking: pt states  Symbicort 80 mcg-4.5 mcg/inh inhalation aerosol, 2 puff(s), INH, Daily, 6 refills  tiZANidine 4 mg oral tablet,  4 mg= 1 tab(s), Oral, TID  Allergies  Bactrim?(vomiting)  Social History  Abuse/Neglect  No, No, Yes, 01/15/2020  Alcohol  Past, 05/14/2018  Employment/School  Unemployed, Highest education level: High school., 10/23/2018  Home/Environment  Lives with Spouse. Living situation: Home/Independent. Alcohol abuse in household: No. Substance abuse in household: No. Smoker in household: Yes. Feels unsafe at home: No. Safe place to go: Yes. Family/Friends available for support: Yes., 10/23/2018  Nutrition/Health  Regular, 05/29/2019  Sexual  Sexually active: Yes. Uses condoms: No., 11/19/2018  Substance Use  Past, 12/05/2017  Tobacco  10 or more cigarettes (1/2 pack or more)/day in last 30 days, Cigarettes, No, 01/15/2020  Family History  Diabetes mellitus type 2: Father.  Immunizations  Vaccine Date Status Comments   zoster vaccine, inactivated 08/08/2019 Recorded [8/13/2019] Immunization record scanned into chart   zoster vaccine, inactivated 05/29/2019 Recorded [facility]  [5/30/2019] Record scanned into chart   pneumococcal 13-valent conjugate vaccine 02/13/2019 Given    tetanus/diphtheria/pertussis, acel(Tdap) 10/23/2018 Given    influenza virus vaccine, inactivated 10/23/2018 Given    influenza virus vaccine, inactivated 10/19/2011 Recorded    Health Maintenance  Health Maintenance  ???Pending?(in the next year)  ??? ??OverDue  ??? ? ? ?Diabetes Screening due??and every?  ??? ??Due?  ??? ? ? ?Colorectal Screening due??11/18/19??and every 1??year(s)  ??? ? ? ?Aspirin Therapy for CVD Prevention due??01/15/20??and every 1??year(s)  ??? ? ? ?COPD Maintenance-Spirometry due??01/15/20??and every?  ??? ??Due In Future?  ??? ? ? ?Lung Cancer Screening not due until??04/26/20??and every 1??year(s)  ??? ? ? ?COPD Management-COPD Medications Prescribed not due until??09/29/20??and every 1??year(s)  ??? ? ? ?COPD Management-Oxygen Assessment not due until??10/05/20??and every 1??year(s)  ??? ? ? ?Alcohol Misuse Screening  not due until??01/01/21??and every 1??year(s)  ??? ? ? ?Obesity Screening not due until??01/01/21??and every 1??year(s)  ??? ? ? ?Smoking Cessation not due until??01/01/21??and every 1??year(s)  ??? ? ? ?Blood Pressure Screening not due until??01/14/21??and every 1??year(s)  ??? ? ? ?Body Mass Index Check not due until??01/14/21??and every 1??year(s)  ??? ? ? ?Depression Screening not due until??01/14/21??and every 1??year(s)  ???Satisfied?(in the past 1 year)  ??? ??Satisfied?  ??? ? ? ?ADL Screening on??01/15/20.??Satisfied by Hanny Min LPN  ??? ? ? ?Alcohol Misuse Screening on??01/15/20.??Satisfied by Nathan Murdock MD  ??? ? ? ?Blood Pressure Screening on??01/15/20.??Satisfied by Hanny Min LPN  ??? ? ? ?Body Mass Index Check on??01/15/20.??Satisfied by Hanny Min LPN  ??? ? ? ?Breast Cancer Screening on??06/11/19.??Satisfied by Debora Rodas  ??? ? ? ?COPD Maintenance-Pulmonary Rehab Education on??01/15/20.??Satisfied by Nathan Murdock MD  ??? ? ? ?COPD Management-Oxygen Assessment on??10/05/19.??Satisfied by Nancy Gusman RN  ??? ? ? ?COPD Management-COPD Medications Prescribed on??09/29/19.??Satisfied by Tiffanie Goncalves  ??? ? ? ?Depression Screening on??01/15/20.??Satisfied by Hanny Min LPN  ??? ? ? ?Diabetes Screening on??10/05/19.??Satisfied by Pedro Izquierdo  ??? ? ? ?Influenza Vaccine on??01/15/20.??Satisfied by Nathan Murdock MD  ??? ? ? ?Lung Cancer Screening on??04/26/19.??Satisfied by Pedro Samayoa Jr.  ??? ? ? ?Obesity Screening on??01/15/20.??Satisfied by Hanny Min LPN  ??? ? ? ?Smoking Cessation on??01/15/20.??Satisfied by Natahn Murdock MD  ??? ? ? ?Zoster Vaccine on??08/08/19.??Satisfied by Ninoska Urbina  ?      Patient discussed in detail and seen by me in the clinic with medicine residents yesterday  Complicated medical history in the last 6 months  We will need to repeat all lab work for the patient and discussed with  Dr. Sánchez-rheumatology  Agree with above assessment and plan

## 2022-05-02 NOTE — HISTORICAL [facility]ERNER
This is a historical note converted from Cerdaniella. Formatting and pictures may have been removed.  Please reference Cerdaniella for original formatting and attached multimedia. Chief Complaint  follow up with medication refills  History of Present Illness  65-year-old female with past medical history of seronegative rheumatoid arthritis, Graves disease s/p STEINER now on Synthroid, COPD stage III, RLL pulmonary nodule, and anxiety who presents to clinic for f/u.? Last seen in IM clinic 11/2020. Patient was last seen by Rheumatology 3/2021 and changed to Enbrel 50mg? weekly and doing well. Patient denies any CP, SOB, fever, chills, n/v. She presents for med refill and complains only of bell wrist pain associated with 1st 3 digit numbness, tingling and pain. Patient was recently seen in ED 6/2021 and xr wrist showed only degenerative changes and narrowing of the carpometacarpal joints she was discharged with indomethacin and tramadol. however, patient continues to complain of swelling, deformity, paresthesias and persistent pain.  Review of Systems  per hpi  Physical Exam  Vitals & Measurements  T:?36.8? ?C (Oral)? HR:?88(Peripheral)? RR:?18? BP:?135/77?  HT:?154.00?cm? WT:?51.000?kg? BMI:?21.5?  General: Alert and oriented, No acute distress.  HEENT: PERRLA, EOMI, Normal hearing, Oral mucosa moist, No pharyngeal exudates  Respiratory:CTABL, No c/r/w, Respirations non-labored, Breath sounds equal.  Cardiovascular: Normal rate, Regular rhythm, No c/m/r/g  Gastrointestinal: Soft, NT, ND  Musculoskeletal: Normal range of motion, Normal strength, No swelling. RUE:?+right wrist and hand?swelling, erythema, warmth. (negative Tinels). no abrasions/cuts/skin changes  Integumentary: Warm, Dry, Intact.  Neurologic: Alert, Oriented, No focal deficits, CN II-XII grossly intact  Psychiatric: Cooperative, Appropriate mood & affect, Normal judgment.  ?  ?  XR Wrist 6/2021  FINDINGS:  There is demineralization of the visualized osseous  structures  commensurate with patients age?  No acute displaced fractures or dislocations.  Some degenerative changes are identified with some narrowing of the  carpometacarpal joints including the first carpometacarpal joint  articular spaces are otherwise preserved with smooth articular  surfaces  No blastic or lytic lesions.  Soft tissues within normal limits.  ?  IMPRESSION:  ?  Degenerative changes  ?  Assessment/Plan  Right Wrist pain  possible volar cyst  -seen in ED 6/2021 d/c with tramadol, indomethacin  -referral for Orthopedic surgery approved, pending appointment. attempted to call office today, but no answer  ?   Hypothyroidism?E03.9  -Ordered free T4 and TSH  -Continue levothyroxine 100  -We will adjust dose based on?labs  ?  ?  COPD - Chronic obstructive pulmonary disease?J44.9  -Mild wheezing on exam  -Patient has baseline cough recommended over-the-counter mucus relief  -Continue albuterol and Symbicort, DuoNebs, ProAir  -Educated on proper use  ?  ?   Tobacco user?Z72.0  -Smokes 6 to 7 cigarettes/day  -Previously failed Chantix?secondary to nausea  -Recommended/counseled to decrease  -Declines?gum or patches or lozenges at this time, not amenable to cessation  ?   ?  Rheumatoid arthritis, unspecified?M06.9  -Follows with rheumatology?  -d/c Humira, Started Enbrel 3/2021 per Rheumatology ast visit  ?   ?  Pulmonary nodule?R91.1  -reviewed CT thorax from 3/2020, stable 1.3 cm right lower lobe pulmonary nodule, PET scan negative for hypermetabolism  -Patient no showed for CT scan scheduled by pulmonology  -stable,?per pulmonology, continue yearly screening?  -Continue Symbicort and?Proair  ?  ?   Anxiety?F41.9  -Continue Seroquel  -Discontinue clonazepam, patient states previously prescribed by  provider  -Discussed that I do not feel comfortable prescribing clonazepam long term and placed referral for  provider  -Has Behavioral health appointment scheduled next month  ?  ?  Vitamin D  deficiency  -Previously treated with 50,000 units weekly?for 8 weeks  -Reordered vitamin D level  ?  Breast cancer screening - mammo 6/2019, reviewed, reordered  Gynecology?screening - negative pap/HPV 11/2018  Colon cancer screening - negative FIT 1/2020, re-ordered  Lung cancer screening - reviewed CT thorax from 3/2020, stable 1.3 cm right lower lobe pulmonary nodule, PET scan negative for hypermetabolism, reordered CT thorax w/o contrast  Vaccines  Pneumonia 13 2019  Zoster series completed  Tdap 2018  PNA 23 -11/2020  ?  RTC 3 months  F/U CBC, CMP, lipid, TSH, FT4, UA, Uric acid  F/U Ortho Surgery  Faculty addendum:? Case reviewed with resident at time of office visit.? Agree with findings, assessment and management plan as documented.   Problem List/Past Medical History  Ongoing  Anxiety  COPD - Chronic obstructive pulmonary disease  High risk medication use  Hypothyroidism  Pulmonary nodule  Rheumatoid arthritis, unspecified  Tobacco user  Historical  Arthritis  Graves disease  H/O: hyperthyroidism  Procedure/Surgical History  Insertion of Infusion Device into Left Basilic Vein, Percutaneous Approach (03/17/2020)  Insertion of Infusion Device into Superior Vena Cava, Percutaneous Approach (10/05/2019)  Transfusion of Nonautologous Red Blood Cells into Peripheral Vein, Percutaneous Approach (10/01/2019)  Avulsion of nail plate, partial or complete, simple; single (09/22/2018)  Extraction of Finger Nail, External Approach (09/22/2018)  Bilateral tubal ligation  Cholecystectomy;  Cholecystectomy;  Thyroidectomy   Medications  Albuterol (Eqv-ProAir HFA) 90 mcg/inh inhalation aerosol, See Instructions, 1 refills  DuoNeb 0.5 mg-2.5 mg/3 mL inhalation solution, 3 mL, NEB, QID, PRN, 3 refills  Enbrel SureClick 50 mg/mL subcutaneous solution, 50 mg, Subcutaneous, qWeek, 2 refills  folic acid 1 mg oral tablet, 1 mg= 1 tab(s), Oral, Daily, 11 refills  indomethacin 25 mg oral capsule, 25 mg= 1 cap(s), Oral, TID, PRN,?  ?Not taking  levothyroxine 100 mcg (0.1 mg) oral tablet, 100 mcg= 1 tab(s), Oral, Daily, 2 refills  QUEtiapine 200 mg oral tablet, See Instructions  Symbicort 80 mcg-4.5 mcg/inh inhalation aerosol, 2 puff(s), INH, Daily, 3 refills  VITAMIN D 2,000 50 MCG Capsule, See Instructions  Allergies  sulfamethoxazole-trimethoprim?(Nausea and vomiting)  Bactrim?(vomiting)  Social History  Abuse/Neglect  No, No, Yes, 06/20/2021  Alcohol  Past, 05/14/2018  Employment/School  Unemployed, Highest education level: High school., 10/23/2018  Exercise  Exercise type: leg lifts., 09/24/2020  Home/Environment  Lives with Spouse. Living situation: Home/Independent. Alcohol abuse in household: No. Substance abuse in household: No. Smoker in household: Yes. Feels unsafe at home: No. Safe place to go: Yes. Family/Friends available for support: Yes., 10/23/2018    Never in , 03/18/2021  Nutrition/Health  Regular, Fair, 09/24/2020  Sexual  Sexually active: Yes. Uses condoms: No., 11/19/2018  Spiritual/Cultural  Religion, 03/18/2021  Substance Use  Never, 09/24/2020  Tobacco  5-9 cigarettes (between 1/4 to 1/2 pack)/day in last 30 days, No, 06/20/2021  Family History  Diabetes mellitus type 2: Father.  Immunizations  Vaccine Date Status Comments   influenza virus vaccine, inactivated 11/19/2020 Given GONZALO WELL   pneumococcal 23-polyvalent vaccine 11/19/2020 Given GONZALO WELL   influenza virus vaccine, inactivated 01/15/2020 Given    zoster vaccine, inactivated 08/08/2019 Recorded [8/13/2019] Immunization record scanned into chart   zoster vaccine, inactivated 05/29/2019 Recorded [facility]  [5/30/2019] Record scanned into chart   pneumococcal 13-valent conjugate vaccine 02/13/2019 Given    tetanus/diphtheria/pertussis, acel(Tdap) 10/23/2018 Given    influenza virus vaccine, inactivated 10/23/2018 Given    tetanus/diphtheria/pertussis, acel(Tdap) 09/22/2018 Given    influenza virus vaccine, inactivated 10/19/2011 Recorded     Health Maintenance  Health Maintenance  ???Pending?(in the next year)  ??? ??OverDue  ??? ? ? ?Alcohol Misuse Screening due??01/02/21??and every 1??year(s)  ??? ? ? ?Colorectal Screening due??01/15/21??and every 1??year(s)  ??? ? ? ?COPD Maintenance-Pulmonary Rehab Education due??01/15/21??and every 1??year(s)  ??? ??Due?  ??? ? ? ?Lung Cancer Screening due??05/18/21??and every 1??year(s)  ??? ? ? ?Coronary Artery Disease Maintenance-Antiplatelet Agent Prescribed due??06/30/21??Unknown Frequency  ??? ??Refused?  ??? ? ? ?Aspirin Therapy for CVD Prevention due??06/30/21??and every 1??year(s)  ??? ??Due In Future?  ??? ? ? ?Influenza Vaccine not due until??10/01/21??and every 1??day(s)  ??? ? ? ?Cervical Cancer Screening not due until??11/18/21??and every 3??year(s)  ??? ? ? ?Diabetes Screening not due until??12/08/21??and every 1??year(s)  ??? ? ? ?Obesity Screening not due until??01/01/22??and every 1??year(s)  ??? ? ? ?Smoking Cessation not due until??01/01/22??and every 1??year(s)  ??? ? ? ?Depression Screening not due until??03/18/22??and every 1??year(s)  ??? ? ? ?ADL Screening not due until??03/18/22??and every 1??year(s)  ??? ? ? ?COPD Management-COPD Medications Prescribed not due until??04/09/22??and every 1??year(s)  ??? ? ? ?COPD Management-Oxygen Assessment not due until??06/20/22??and every 1??year(s)  ???Satisfied?(in the past 1 year)  ??? ??Satisfied?  ??? ? ? ?ADL Screening on??03/18/21.??Satisfied by Maryanne Garcai RN  ??? ? ? ?Blood Pressure Screening on??06/30/21.??Satisfied by Vince Zazueta LPN  ??? ? ? ?Body Mass Index Check on??06/30/21.??Satisfied by Vince Zazueta LPN  ??? ? ? ?Breast Cancer Screening on??03/16/21.??Satisfied by Shanthi Regaaldo  ??? ? ? ?COPD Management-Oxygen Assessment on??06/20/21.??Satisfied by Patricia Ivey RN  ??? ? ? ?COPD Management-COPD Medications Prescribed on??04/09/21.??Satisfied by Nathan Murdock MD  ??? ? ? ?Depression Screening  on??03/18/21.??Satisfied by Radha CLIFTON, Maryanne SAHU  ??? ? ? ?Diabetes Screening on??12/08/20.??Satisfied by Philip Duong  ??? ? ? ?Influenza Vaccine on??11/19/20.??Satisfied by Gracie Tracy LPN  ??? ? ? ?Obesity Screening on??06/30/21.??Satisfied by Vince Zazueta LPN  ??? ? ? ?Smoking Cessation on??01/12/21.??Satisfied by Paulina Stearns MD  ??? ??Postponed?  ??? ? ? ?Lung Cancer Screening on??11/19/20.??Recorded by Elen Ceron MD  ??? ??Refused?  ??? ? ? ?Aspirin Therapy for CVD Prevention on??11/19/20.??Recorded by Elen Ceron MD  ?

## 2022-09-02 ENCOUNTER — TELEPHONE (OUTPATIENT)
Dept: RHEUMATOLOGY | Facility: CLINIC | Age: 67
End: 2022-09-02
Payer: MEDICAID

## 2022-09-02 NOTE — TELEPHONE ENCOUNTER
PA for Humira done on Cover My Meds  Approved    Key: [id number] - PA Case ID: [id number] - Rx #: [id number] help? Call us at [phone]  Outcome Approved today  PA Case: [id number], Status: Approved, Coverage Starts on: 1/1/2022 12:00:00 AM, Coverage Ends on: 12/31/2022 12:00:00 AM. Questions? Contact [phone].

## 2022-10-13 DIAGNOSIS — Z87.891 PERSONAL HISTORY OF TOBACCO USE, PRESENTING HAZARDS TO HEALTH: Primary | ICD-10-CM

## 2023-02-27 ENCOUNTER — HOSPITAL ENCOUNTER (OUTPATIENT)
Dept: RADIOLOGY | Facility: HOSPITAL | Age: 68
Discharge: HOME | End: 2023-02-27
Attending: FAMILY MEDICINE
Payer: MEDICARE

## 2023-02-27 DIAGNOSIS — Z78.0 ASYMPTOMATIC AGE-RELATED POSTMENOPAUSAL STATE: ICD-10-CM

## 2023-02-27 DIAGNOSIS — Z12.31 BREAST CANCER SCREENING BY MAMMOGRAM: ICD-10-CM

## 2023-02-27 PROCEDURE — 77063 MAMMO DIGITAL SCREENING BILAT WITH TOMO: ICD-10-PCS | Mod: 26,,, | Performed by: RADIOLOGY

## 2023-02-27 PROCEDURE — 77080 DXA BONE DENSITY AXIAL: CPT | Mod: TC

## 2023-02-27 PROCEDURE — 77067 SCR MAMMO BI INCL CAD: CPT | Mod: 26,,, | Performed by: RADIOLOGY

## 2023-02-27 PROCEDURE — 77067 SCR MAMMO BI INCL CAD: CPT | Mod: TC

## 2023-02-27 PROCEDURE — 77063 BREAST TOMOSYNTHESIS BI: CPT | Mod: 26,,, | Performed by: RADIOLOGY

## 2023-02-27 PROCEDURE — 77067 MAMMO DIGITAL SCREENING BILAT WITH TOMO: ICD-10-PCS | Mod: 26,,, | Performed by: RADIOLOGY

## 2023-03-21 ENCOUNTER — HOSPITAL ENCOUNTER (OUTPATIENT)
Dept: RADIOLOGY | Facility: HOSPITAL | Age: 68
Discharge: HOME | End: 2023-03-21
Attending: INTERNAL MEDICINE
Payer: MEDICARE

## 2023-03-21 DIAGNOSIS — Z87.891 PERSONAL HISTORY OF TOBACCO USE, PRESENTING HAZARDS TO HEALTH: ICD-10-CM

## 2023-03-21 PROCEDURE — 71271 CT THORAX LUNG CANCER SCR C-: CPT | Mod: TC

## 2023-05-18 ENCOUNTER — TELEPHONE (OUTPATIENT)
Dept: RHEUMATOLOGY | Facility: CLINIC | Age: 68
End: 2023-05-18
Payer: MEDICARE

## 2023-05-18 NOTE — TELEPHONE ENCOUNTER
----- Message from Zandra Malave sent at 5/18/2023  9:39 AM CDT -----  Regarding: Medication  Yuridia Scott called requesting a call back concerning medication. Please return call @ [phone]

## 2023-05-18 NOTE — TELEPHONE ENCOUNTER
Spoke with pharmacist estefany and advised her that the patient hasn't been seen since September of last year with no follow up appt. Scheduled. We will have to schedule the patient an appointment.

## 2024-06-03 DIAGNOSIS — Z87.891 HISTORY OF TOBACCO USE: Primary | ICD-10-CM

## 2024-10-28 ENCOUNTER — TELEPHONE (OUTPATIENT)
Dept: RADIOLOGY | Facility: HOSPITAL | Age: 69
End: 2024-10-28
Payer: MEDICARE

## 2025-01-31 ENCOUNTER — HOSPITAL ENCOUNTER (OUTPATIENT)
Dept: RADIOLOGY | Facility: HOSPITAL | Age: 70
Discharge: HOME | End: 2025-01-31
Attending: NURSE PRACTITIONER
Payer: MEDICARE

## 2025-01-31 DIAGNOSIS — Z87.891 HISTORY OF TOBACCO USE: ICD-10-CM

## 2025-01-31 PROCEDURE — 71271 CT THORAX LUNG CANCER SCR C-: CPT | Mod: TC

## 2025-04-01 ENCOUNTER — OFFICE VISIT (OUTPATIENT)
Dept: PULMONOLOGY | Facility: CLINIC | Age: 70
End: 2025-04-01

## 2025-04-01 VITALS
RESPIRATION RATE: 18 BRPM | OXYGEN SATURATION: 96 % | SYSTOLIC BLOOD PRESSURE: 130 MMHG | HEIGHT: 59 IN | HEART RATE: 95 BPM | DIASTOLIC BLOOD PRESSURE: 77 MMHG | WEIGHT: 125 LBS | BODY MASS INDEX: 25.2 KG/M2 | TEMPERATURE: 98 F

## 2025-04-01 DIAGNOSIS — J44.1 COPD EXACERBATION: Primary | ICD-10-CM

## 2025-04-01 RX ORDER — CEFUROXIME AXETIL 250 MG/1
250 TABLET ORAL 2 TIMES DAILY
COMMUNITY
Start: 2025-03-06

## 2025-04-01 RX ORDER — INHALER, ASSIST DEVICES
SPACER (EA) MISCELLANEOUS
Qty: 50 EACH | Refills: 0 | Status: SHIPPED | OUTPATIENT
Start: 2025-04-01

## 2025-04-01 RX ORDER — LEVOTHYROXINE SODIUM 137 UG/1
137 TABLET ORAL
COMMUNITY
Start: 2025-01-27

## 2025-04-01 NOTE — PROGRESS NOTES
"  [facility] Internal Medicine Clinic Visit    Chief Complaint:      EST PT COPD/ Lung Nodule     Subjective:     HPI:  Evert Goodman is a 69 y.o. female with PMH of severe COPD on portable oxygen concentrator on 3 L, Graves disease, and RA.  She presents to clinic today for follow-up of chronic medical conditions.  Last seen in his clinic over 2 years ago.  Reports having an average of 3 hospitalizations per year.  Last hospitalized 10/09/2024.  Currently using 3 L of oxygen around the clock.  She reports using Breztri inhaler, is compliant, however does not have a spacer.  Lifetime smoker, 2 packs per day quit last year.  Reports that portable oxygen concentrator continues to give her yellow light indicating an issue.  She states that there is a kink where the tube connected directly to the portable concentrator.  She has contacted oxygen company and states that they have told her that the concentrator is working fine.  No other complaints at this time.  Patient states that she did use to use Trelegy in the past which worked better than breast treat.  She believes that she may be inhaling the medication incorrectly.  Was informed that she can use a spacer however, she has never used one.  Of note, had CT chest low-dose which showed stable pulmonary nodules 2 mm and 12 mm right lobe.      Review of Systems  A comprehensive 12 point review of systems was completed.  Please see above for pertinent positives and negatives.     Objective:   Last 24 Hour Vital Signs:  Vitals  BP: 130/77  Temp: 97.6 °F (36.4 °C)  Temp Source: Oral  Pulse: 95  Resp: 18  SpO2: 96 %  Height: 4' 11" (149.9 cm)  Weight: 56.7 kg (125 lb)    Physical Examination:  General: Awake, alert, & oriented to person, place & time. No acute distress.  Ill-appearing.  Psychiatric: Mood and affect normal  HEENT: Normocephalic, atraumatic. Face symmetric.  Cardiovascular: Regular rate & rhythm. Normal S1 & S2 w/out murmurs, rubs or gallops.  Pulmonary: Bilateral " symmetric chest rise. Non-labored, no wheezes, rhonchi or crackles are appreciated.  Decreased lung sounds bilaterally.  Nasal cannula with portable oxygen concentrator in the room on 3 L. used around the clock.  Abdominal:  Nondistended. Bowel sounds present  Extremities: No clubbing, cyanosis or edema.  Skin:  Exposed skin is warm & dry.  Pallor.  Neuro:   Patient moves all extremities equally. Sensation intact bilateraly.    Labs  CMP:   Recent Labs   Lab 05/27/24  0440 05/30/24  0428 06/01/24  0459   Sodium 140 137 140   Potassium 4.4 4.6 3.5   Carbon Dioxide 34 H 32 35 H   Blood Urea Nitrogen 12 15 20 H   Creatinine 1.00 0.78 0.88   Calcium 8.4 L 9.1 9.2   ,   CBC:   Recent Labs   Lab 05/11/22  1338   WBC 9.1   Hemoglobin 14.8   Hematocrit 44.6   MCV 95.2   RDW 13.6   ,   DM:   Recent Labs   Lab 05/27/24  0440 05/30/24  0428 06/01/24  0459   Creatinine 1.00 0.78 0.88   ,   FLP:   Recent Labs   Lab 05/26/24  2239   Cholesterol 192   HDL 63   Triglycerides 86      ,   Thyroid:     ,   Anemia:   Recent Labs   Lab 05/11/22  1338   Hemoglobin 14.8   Hematocrit 44.6        Assessment & Plan:     Severe COPD, home portable oxygen concentrator at 3 L  -FEV1 42%, low ratio, and DLCO 44   -continue using home portable oxygen concentrator around the clock  -continuing Breztri inhaler, spacer ordered and sent to pharmacy of choice  -continue DuoNebs t.i.d.  -continues to require multiple hospitalizations    Pulmonary nodules  -LDCT last done January 2025  -CT low-dose showed 2 mm and 12 mm pulmonary nodules stable in appearance continue annual screening    To be addressed at next follow-up  -oxygen recertification, Rotec    Return to clinic in 6 months    Madhav Antunez MD  Internal Medicine - PGY-3

## 2025-08-22 ENCOUNTER — HOSPITAL ENCOUNTER (OUTPATIENT)
Dept: RADIOLOGY | Facility: HOSPITAL | Age: 70
Discharge: HOME | End: 2025-08-22
Attending: NURSE PRACTITIONER
Payer: MEDICARE

## 2025-08-22 ENCOUNTER — HOSPITAL ENCOUNTER (OUTPATIENT)
Dept: RADIOLOGY | Facility: HOSPITAL | Age: 70
Discharge: HOME | End: 2025-08-22
Attending: FAMILY MEDICINE
Payer: MEDICARE

## 2025-08-22 DIAGNOSIS — M81.0 SENILE OSTEOPOROSIS: ICD-10-CM

## 2025-08-22 DIAGNOSIS — Z12.31 SCREENING MAMMOGRAM FOR BREAST CANCER: ICD-10-CM

## 2025-08-22 DIAGNOSIS — Z78.0 POST-MENOPAUSAL: ICD-10-CM

## 2025-08-22 PROCEDURE — 77063 BREAST TOMOSYNTHESIS BI: CPT | Mod: TC

## 2025-08-22 PROCEDURE — 77080 DXA BONE DENSITY AXIAL: CPT | Mod: TC

## 2025-08-22 PROCEDURE — 77081 DXA BONE DENSITY APPENDICULR: CPT | Mod: TC

## 2025-09-03 ENCOUNTER — TELEPHONE (OUTPATIENT)
Dept: RADIOLOGY | Facility: HOSPITAL | Age: 70
End: 2025-09-03
Payer: MEDICARE

## 2025-09-05 ENCOUNTER — TELEPHONE (OUTPATIENT)
Dept: RADIOLOGY | Facility: HOSPITAL | Age: 70
End: 2025-09-05
Payer: MEDICARE